# Patient Record
Sex: MALE | Race: WHITE | Employment: FULL TIME | URBAN - METROPOLITAN AREA
[De-identification: names, ages, dates, MRNs, and addresses within clinical notes are randomized per-mention and may not be internally consistent; named-entity substitution may affect disease eponyms.]

---

## 2024-11-02 ENCOUNTER — HOSPITAL ENCOUNTER (EMERGENCY)
Facility: HOSPITAL | Age: 29
Discharge: HOME | End: 2024-11-02
Attending: EMERGENCY MEDICINE

## 2024-11-02 VITALS
BODY MASS INDEX: 30.53 KG/M2 | SYSTOLIC BLOOD PRESSURE: 90 MMHG | DIASTOLIC BLOOD PRESSURE: 57 MMHG | TEMPERATURE: 98 F | OXYGEN SATURATION: 96 % | HEIGHT: 66 IN | HEART RATE: 60 BPM | RESPIRATION RATE: 20 BRPM | WEIGHT: 190 LBS

## 2024-11-02 DIAGNOSIS — L03.116 CELLULITIS OF LEFT LOWER EXTREMITY: ICD-10-CM

## 2024-11-02 DIAGNOSIS — S81.802A OPEN WOUND OF LEFT LOWER EXTREMITY, INITIAL ENCOUNTER: Primary | ICD-10-CM

## 2024-11-02 PROCEDURE — 99284 EMERGENCY DEPT VISIT MOD MDM: CPT

## 2024-11-02 PROCEDURE — 99283 EMERGENCY DEPT VISIT LOW MDM: CPT

## 2024-11-02 RX ORDER — CEPHALEXIN 500 MG/1
500 CAPSULE ORAL 4 TIMES DAILY
Qty: 28 CAPSULE | Refills: 0 | Status: SHIPPED | OUTPATIENT
Start: 2024-11-02 | End: 2024-11-09

## 2024-11-02 RX ORDER — DOXYCYCLINE 100 MG/1
100 CAPSULE ORAL 2 TIMES DAILY
Qty: 14 CAPSULE | Refills: 0 | Status: SHIPPED | OUTPATIENT
Start: 2024-11-02 | End: 2024-11-09

## 2024-11-02 NOTE — ED PROVIDER NOTES
Patient Seen in: [facility] Emergency Department      History     Chief Complaint   Patient presents with    Cellulitis     Stated Complaint: blister on thigh    Subjective:   HPI          Objective:     History reviewed. No pertinent past medical history.           History reviewed. No pertinent surgical history.             Social History     Socioeconomic History    Marital status: Single   Tobacco Use    Smoking status: Every Day     Types: Cigarettes    Smokeless tobacco: Never   Vaping Use    Vaping status: Every Day   Substance and Sexual Activity    Alcohol use: Yes     Comment: \"weekends\"    Drug use: Not Currently     Social Drivers of Health     Financial Resource Strain: Not on File (10/24/2024)    Received from [facility]    Financial Resource Strain     Financial Resource Strain: 0   Food Insecurity: Not on File (10/24/2024)    Received from [facility]    Food Insecurity     Food: 0   Transportation Needs: Not on File (10/24/2024)    Received from [facility]    Transportation Needs     Transportation: 0   Physical Activity: Not on File (10/24/2024)    Received from [facility]    Physical Activity     Physical Activity: 0   Stress: Not on File (10/24/2024)    Received from [facility]    Stress     Stress: 0   Social Connections: Not on File (10/24/2024)    Received from [facility]    Social Connections     Connectedness: 0   Housing Stability: Not on File (10/24/2024)    Received from [facility]    Housing Stability     Housin                  Physical Exam     ED Triage Vitals [24 0840]   /72   Pulse 83   Resp 19   Temp 97.9 °F (36.6 °C)   Temp src Oral   SpO2 95 %   O2 Device None (Room air)       Current Vitals:   Vital Signs  BP: 104/72  Pulse: 83  Resp: 19  Temp: 97.9 °F (36.6 °C)  Temp src: Oral    Oxygen Therapy  SpO2: 95 %  O2 Device: None (Room air)        Physical Exam        ED Course   Labs Reviewed - No data to display              MDM      29-year-old male who denies significant past medical history  presents with a wound on his left inner thigh.  Patient states about a year ago, he had a relatively small pimple/boil.  He took antibiotics at that time but since then, he has had a relatively chronic wound.  In the last 1 month, the wound has significantly increased in size and become more painful.  There is been some pus drainage.  He denies fevers, rapid spread, injury, or other symptoms.  He has taken no other medications and has not applied anything to the wound.    On exam, vitals normal, well-appearing, relatively large excavated wound with scar tissue forming at the center but crusty scabbing on the outside with some purulence and mild erythema and induration.  No fluctuance.  Slightly tender to palpation.  No crepitus.  No streaking.    Differential: Large chronic wound with likely superimposed purulent cellulitis.    This wound likely requires debridement and adequate wound care as it has been neglected for some time.    I will prescribe doxycycline and cephalexin and nursing will provide wound care in the ED.  He will be discharged with information for follow-up with the wound clinic with careful return precautions.        MDM    Disposition and Plan     Clinical Impression:  1. Open wound of left lower extremity, initial encounter    2. Cellulitis of left lower extremity         Disposition:  Discharge  11/2/2024  9:30 am    Follow-up:  [facility]  [address]  [facility] [address] [phone]  Schedule an appointment as soon as possible for a visit in 2 day(s)  As needed          Medications Prescribed:  Current Discharge Medication List        START taking these medications    Details   doxycycline 100 MG Oral Cap Take 1 capsule (100 mg total) by mouth 2 (two) times daily for 7 days.  Qty: 14 capsule, Refills: 0      cephALEXin 500 MG Oral Cap Take 1 capsule (500 mg total) by mouth 4 (four) times daily for 7 days.  Qty: 28 capsule, Refills: 0      bacitracin 500  UNIT/GM External Ointment Apply 1 Application topically 2 (two) times daily for 10 days.  Qty: 15 g, Refills: 0                 Supplementary Documentation:

## 2024-11-02 NOTE — ED INITIAL ASSESSMENT (HPI)
Pt presents for c/o large open \"blister\" to left upper thigh x1 year. Pt reports drainage. +foul odor noted

## 2024-11-02 NOTE — DISCHARGE INSTRUCTIONS
Cambie el vendaje bentley vez al día.  Asegúrese de wood la herida y luego volver a aplicar la bacitracina antes de aplicar el vendaje.  Ayr los antibióticos según lo recetado.  Llame a la clínica de heridas para programar bentley walter de seguimiento lo antes posible.  Regrese a la bacilio de emergencias si tiene fiebre, aumento del dolor, aumento del tamaño de la herida u otros síntomas nuevos y preocupantes.